# Patient Record
Sex: MALE | Race: OTHER
[De-identification: names, ages, dates, MRNs, and addresses within clinical notes are randomized per-mention and may not be internally consistent; named-entity substitution may affect disease eponyms.]

---

## 2017-05-25 ENCOUNTER — HOSPITAL ENCOUNTER (OUTPATIENT)
Dept: HOSPITAL 62 - RAD | Age: 42
End: 2017-05-25
Payer: MEDICAID

## 2017-05-25 DIAGNOSIS — N20.0: Primary | ICD-10-CM

## 2017-05-25 PROCEDURE — 74000: CPT

## 2017-05-25 NOTE — RADIOLOGY REPORT (SQ)
EXAM DESCRIPTION:  KUB/ABDOMEN (SINGLE VIEW)



COMPLETED DATE/TIME:  5/25/2017 10:55 am



REASON FOR STUDY:  CALCULUS OF KIDNEY N20.0  CALCULUS OF KIDNEY



COMPARISON:  CT scan dated 5/23/2015



NUMBER OF VIEWS:  One view.



TECHNIQUE:   Supine radiographic image of the abdomen acquired.



LIMITATIONS:  None.



FINDINGS:  BOWEL GAS PATTERN: Normal bowel gas pattern. No dilated loops.

CALCIFICATIONS: 4 mm stone in the lower pole of the right kidney.  Questionable 1 cm stone in the rig
ht renal pelvis.  Couple calcifications in the left pelvis which on review of previous CT appear to r
epresent phleboliths.  No definite ureteral stone.

SOFT TISSUES: No gross mass or suggestion of organomegaly.

HARDWARE: None in the abdomen.

BONES: No acute fracture. No worrisome bone lesions.

OTHER: No other significant finding.



IMPRESSION:  Right nephrolithiasis without evidence of ureteral stone.



TECHNICAL DOCUMENTATION:  JOB ID:  [id number]

 2011 [employer]- All Rights Reserved

## 2017-06-15 ENCOUNTER — HOSPITAL ENCOUNTER (OUTPATIENT)
Dept: HOSPITAL 62 - SC | Age: 42
Discharge: HOME | End: 2017-06-15
Payer: MEDICAID

## 2017-06-15 DIAGNOSIS — N20.0: Primary | ICD-10-CM

## 2017-06-15 DIAGNOSIS — F17.210: ICD-10-CM

## 2017-06-15 DIAGNOSIS — N20.1: ICD-10-CM

## 2017-06-15 LAB
ANION GAP SERPL CALC-SCNC: 12 MMOL/L (ref 5–19)
APPEARANCE UR: CLEAR
BILIRUB UR QL STRIP: NEGATIVE
BUN SERPL-MCNC: 13 MG/DL (ref 7–20)
CALCIUM: 9.5 MG/DL (ref 8.4–10.2)
CHLORIDE SERPL-SCNC: 106 MMOL/L (ref 98–107)
CO2 SERPL-SCNC: 25 MMOL/L (ref 22–30)
CREAT SERPL-MCNC: 0.75 MG/DL (ref 0.52–1.25)
ERYTHROCYTE [DISTWIDTH] IN BLOOD BY AUTOMATED COUNT: 12.7 % (ref 11.5–14)
GLUCOSE SERPL-MCNC: 95 MG/DL (ref 75–110)
GLUCOSE UR STRIP-MCNC: NEGATIVE MG/DL
HCT VFR BLD CALC: 45.1 % (ref 37.9–51)
HGB BLD-MCNC: 15.2 G/DL (ref 13.5–17)
HGB HCT DIFFERENCE: 0.5
KETONES UR STRIP-MCNC: NEGATIVE MG/DL
MCH RBC QN AUTO: 30.9 PG (ref 27–33.4)
MCHC RBC AUTO-ENTMCNC: 33.8 G/DL (ref 32–36)
MCV RBC AUTO: 92 FL (ref 80–97)
NITRITE UR QL STRIP: NEGATIVE
PH UR STRIP: 7 [PH] (ref 5–9)
PHOSPHATE SERPL-MCNC: 3.7 MG/DL (ref 2.5–4.5)
POTASSIUM SERPL-SCNC: 4.3 MMOL/L (ref 3.6–5)
PROT UR STRIP-MCNC: NEGATIVE MG/DL
RBC # BLD AUTO: 4.92 10^6/UL (ref 4.35–5.55)
SODIUM SERPL-SCNC: 143.4 MMOL/L (ref 137–145)
SP GR UR STRIP: 1.02
URATE SERPL-MCNC: 7.4 MG/DL (ref 3.5–8.5)
UROBILINOGEN UR-MCNC: NEGATIVE MG/DL (ref ?–2)
WBC # BLD AUTO: 5.7 10^3/UL (ref 4–10.5)

## 2017-06-15 PROCEDURE — 85027 COMPLETE CBC AUTOMATED: CPT

## 2017-06-15 PROCEDURE — 84550 ASSAY OF BLOOD/URIC ACID: CPT

## 2017-06-15 PROCEDURE — 36415 COLL VENOUS BLD VENIPUNCTURE: CPT

## 2017-06-15 PROCEDURE — 81001 URINALYSIS AUTO W/SCOPE: CPT

## 2017-06-15 PROCEDURE — 84100 ASSAY OF PHOSPHORUS: CPT

## 2017-06-15 PROCEDURE — 0TF3XZZ FRAGMENTATION IN RIGHT KIDNEY PELVIS, EXTERNAL APPROACH: ICD-10-PCS

## 2017-06-15 PROCEDURE — 50590 FRAGMENTING OF KIDNEY STONE: CPT

## 2017-06-15 PROCEDURE — 80048 BASIC METABOLIC PNL TOTAL CA: CPT

## 2017-06-20 NOTE — OPERATIVE REPORT
Operative Report


PREOPERATIVE DIAGNOSIS: Right renal calculus


POSTOPERATIVE DIAGNOSIS: Right renal calculus


OPERATION: Right extracorporeal shockwave lithotripsy


SURGEON: JOVI ROOT


ANESTHESIA: Moderate Sedation


COMPLICATIONS: 


None


INTRAOPERATIVE FINDINGS: The patient was seen in the preoperative holding area 

and the procedure and postoperative instructions reviewed.  He was then brought 

to the lithotripsy truck on a wheelchair.  He was positioned on the lithotripsy 

table so that the area of the right kidney could be visualized with 

fluoroscopy.  The stone was easily seen.  He was then positioned in an XYZ axis 

so that the stone was within the area of energy.  A ModularSix3 Uro II 

lithotripter was utilized.  We started at a low setting of 0.1 and increase 

this to 0.5.  At 300 shocks we took a 3 minute break.  We then resumed therapy 

to where ultimately he received 4000 shocks.  Her maximum energy was 3.5.  The 

stone was seen to break up into multiple pieces.  He was repositioned a couple 

different occasions to the procedure with using fluoroscopy as a guide.  250 

mcg of fentanyl were utilized as well as 6 mg of Versed for sedation.  He was 

hemodynamically stable throughout the procedure while EKG monitoring and pulse 

ox monitoring was utilized.  He will follow-up in the office in the next 2-3 

weeks with a KUB film.  He is to strain his urine.  The wife was reminded that 

if he should have significant pain that is not relieved by the Percocet 30 

tablets prescription provided, he should go to the emergency room.  He is also 

instructed to go to the emergency room if he has a temperature above 100.5.

## 2017-08-20 ENCOUNTER — HOSPITAL ENCOUNTER (EMERGENCY)
Dept: HOSPITAL 62 - ER | Age: 42
Discharge: HOME | End: 2017-08-20
Payer: SELF-PAY

## 2017-08-20 VITALS — SYSTOLIC BLOOD PRESSURE: 135 MMHG | DIASTOLIC BLOOD PRESSURE: 89 MMHG

## 2017-08-20 DIAGNOSIS — T65.891A: Primary | ICD-10-CM

## 2017-08-20 DIAGNOSIS — R11.2: ICD-10-CM

## 2017-08-20 PROCEDURE — 99283 EMERGENCY DEPT VISIT LOW MDM: CPT
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
## 2018-08-26 ENCOUNTER — HOSPITAL ENCOUNTER (EMERGENCY)
Dept: HOSPITAL 62 - ER | Age: 43
Discharge: HOME | End: 2018-08-26
Payer: SELF-PAY

## 2018-08-26 VITALS — DIASTOLIC BLOOD PRESSURE: 89 MMHG | SYSTOLIC BLOOD PRESSURE: 135 MMHG

## 2018-08-26 DIAGNOSIS — K03.81: Primary | ICD-10-CM

## 2018-08-26 DIAGNOSIS — K08.89: ICD-10-CM

## 2018-08-26 DIAGNOSIS — F17.200: ICD-10-CM

## 2018-08-26 PROCEDURE — 99282 EMERGENCY DEPT VISIT SF MDM: CPT

## 2018-08-26 NOTE — ER DOCUMENT REPORT
HPI





- HPI


Patient complains to provider of: Cracked tooth


Onset: Yesterday


Pain Level: 5


Context: 





43-year-old male complaining of pain to his right lower jaw with a cracked 

tooth.  No facial swelling or gum swelling.  No fever or chills.  The pain 

radiates into his ear.


Associated Symptoms: None


Exacerbated by: Other - Chewing


Similar symptoms previously: No


Recently seen / treated by doctor: No





- ROS


ROS below otherwise negative: Yes


Systems Reviewed and Negative: Yes All other systems reviewed and negative





- CONSTITUTIONAL


Constitutional: DENIES: Fever, Chills





- REPRODUCTIVE


Reproductive: DENIES: Pregnant:





Past Medical History





- General


Information source: Patient





- Social History


Smoking Status: Current Every Day Smoker


Lives with: Family


Family History: Reviewed & Not Pertinent


Patient has suicidal ideation: No


Patient has homicidal ideation: No


Renal/ Medical History: Reports: Hx Kidney Stones


Infectious Medical History: Denies: Hx Hepatitis


Past Surgical History: Reports: Hx Appendectomy, Hx Orthopedic Surgery





- Immunizations


Hx Diphtheria, Pertussis, Tetanus Vaccination: Yes





Vertical Provider Document





- CONSTITUTIONAL


Agree With Documented VS: Yes


Exam Limitations: No Limitations





- INFECTION CONTROL


TRAVEL OUTSIDE OF THE [place] IN LAST 30 DAYS: No





- HEENT


HEENT: negative: Tympanic Membrane Red, Tympanic Membrane Bulging


Notes: 





Cracked tooth to the second right lower molar no gingival inflammation or 

abscess.  No facial swelling or tenderness.





- NECK


Neck: Supple.  negative: Lymphadenopathy-Left, Lymphadenopathy-Right





- MUSCULOSKELETAL/EXTREMETIES


Musculoskeletal/Extremeties: MAEW





- NEURO


Level of Consciousness: Awake





- DERM


Integumentary: No Rash





Course





- Vital Signs


Vital signs: 


 











Temp Pulse Resp BP Pulse Ox


 


 98.2 F   61   16   135/89 H  97 


 


 08/26/18 12:17  08/26/18 12:17  08/26/18 12:17  08/26/18 12:17  08/26/18 12:17














Discharge





- Discharge


Clinical Impression: 


 Cracked tooth, dental pain





Condition: Good


Disposition: HOME, SELF-CARE


Instructions:  Acetaminophen, Dentist, Use of Over-The-Counter Ibuprofen ([facility]), 

Penicillin V K ([facility]), Toothache ([facility])


Additional Instructions: 


See the dentist


Topical lidocaine to numb the pain


Tylenol up to 4000 mg a day for pain


Ibuprofen up to 800 mg 3 times a day for inflammation and pain


Return to the emergency room any concerns





Prescriptions: 


Penicillin V Potassium [Penicillin Vk 500 mg Tablet] 500 mg PO QID #40 tablet


Referrals: 


ERROL WALLS MD [Primary Care Provider] - Follow up as needed